# Patient Record
(demographics unavailable — no encounter records)

---

## 2025-03-24 NOTE — DISCUSSION/SUMMARY
[ Transition] :  transition [ Care] :  care [Nutritional Adequacy] : nutritional adequacy [Parental Well-Being] : parental well-being [Safety] : safety [Hepatitis B In Hospital] : Hepatitis B administered while in the hospital [FreeTextEntry1] : 5 day old male born full term/, uncomplicated nursery course, here for initial  visit. Well appearing  on exam. Passed CCHD, NBHS in nursery. NBS pending at this time. TcB of 10.   -Age-appropriate anticipatory guidance provided -RTC for next WCC visit/sooner if any concerns arise

## 2025-03-24 NOTE — PHYSICAL EXAM
[Alert] : alert [Acute Distress] : no acute distress [Normocephalic] : normocephalic [Flat Open Anterior [place]] : flat open anterior fontanelle [Icteric sclera] : icteric sclera [Red Reflex Bilateral] : red reflex bilateral [Normally Placed Ears] : normally placed ears [Auricles Well Formed] : auricles well formed [Bony structures visible] : bony structures visible [Discharge] : no discharge [Nares Patent] : nares patent [Palate Intact] : palate intact [Uvula Midline] : uvula midline [Supple, full passive range of motion] : supple, full passive range of motion [Palpable Masses] : no palpable masses [Symmetric Chest Rise] : symmetric chest rise [Clear to Auscultation Bilaterally] : clear to auscultation bilaterally [Regular Rate and Rhythm] : regular rate and rhythm [S1, S2 present] : S1, S2 present [Murmurs] : no murmurs [+2 Femoral Pulses] : +2 femoral pulses [Soft] : soft [Tender] : nontender [Distended] : not distended [Bowel Sounds] : bowel sounds present [Umbilical Stump Dry, Clean, Intact] : umbilical stump dry, clean, intact [Hepatomegaly] : no hepatomegaly [Splenomegaly] : no splenomegaly [Normal external genitailia] : normal external genitalia [Central Urethral Opening] : central urethral opening [Testicles Descended Bilaterally] : testicles descended bilaterally [Patent] : patent [Normally Placed] : normally placed [No Abnormal Lymph Nodes Palpated] : no abnormal lymph nodes palpated [Wilkerson-Ortolani] : negative Wilkerson-Ortolani [Symmetric Flexed Extremities] : symmetric flexed extremities [Spinal Dimple] : no spinal dimple [Tuft of Hair] : no tuft of hair [Startle Reflex] : startle reflex present [Suck Reflex] : suck reflex present [Rooting] : rooting reflex present [Palmar Grasp] : palmar grasp present [Plantar Grasp] : plantar reflex present [Symmetric Shira] : symmetric [place] [Jaundice] : jaundice

## 2025-03-24 NOTE — HISTORY OF PRESENT ILLNESS
[Born at ___ Wks Gestation] : The patient was born at [unfilled] weeks gestation [] : via normal spontaneous vaginal delivery [[place]] : at [facility] [(1) _____] : [unfilled] [(5) _____] : [unfilled] [None] : There were no delivery complications [BW: _____] : weight of [unfilled] [Length: _____] : length of [unfilled] [HC: _____] : head circumference of [unfilled] [DW: _____] : Discharge weight was [unfilled] [RSV vaccine] : RSV vaccine received by mother at least 14 days prior to delivery [HepBsAG] : HepBsAg negative [HIV] : HIV negative [GBS] : GBS negative [Rubella (Immune)] : Rubella immune [VDRL/RPR (Reactive)] : VDRL/RPR nonreactive [Yes] : Yes [] : Circumcision: No [FreeTextEntry9] : O+ [Normal] : Normal [In Bassinet/Crib] : sleeps in bassinet/crib [On back] : sleeps on back [Co-sleeping] : no co-sleeping [Pacifier] : Uses pacifier [No] : No cigarette smoke exposure [Exposure to electronic nicotine delivery system] : No exposure to electronic nicotine delivery system [Water heater temperature set at <120 degrees F] : Water heater temperature set at <120 degrees F [Rear facing car seat in back seat] : Rear facing car seat in back seat [Carbon Monoxide Detectors] : Carbon monoxide detectors at home [Smoke Detectors] : Smoke detectors at home. [Hepatitis B Vaccine Given] : Hepatitis B vaccine given [FreeTextEntry7] : Initial  visit  [de-identified] : No acute concerns  [de-identified] : BM every 2-3 hours

## 2025-04-11 NOTE — REVIEW OF SYSTEMS
Problem: Discharge Planning  Goal: Discharge to home or other facility with appropriate resources  Outcome: Progressing     Problem: Thermoregulation - /Pediatrics  Goal: Maintains normal body temperature  Outcome: Progressing      [Negative] : Genitourinary

## 2025-04-11 NOTE — PHYSICAL EXAM
[Alert] : alert [Acute Distress] : no acute distress [Normocephalic] : normocephalic [Flat Open Anterior [place]] : flat open anterior fontanelle [Icteric sclera] : icteric sclera [Red Reflex Bilateral] : red reflex bilateral [Normally Placed Ears] : normally placed ears [Auricles Well Formed] : auricles well formed [Bony structures visible] : bony structures visible [Discharge] : no discharge [Nares Patent] : nares patent [Palate Intact] : palate intact [Uvula Midline] : uvula midline [Supple, full passive range of motion] : supple, full passive range of motion [Palpable Masses] : no palpable masses [Symmetric Chest Rise] : symmetric chest rise [Clear to Auscultation Bilaterally] : clear to auscultation bilaterally [Regular Rate and Rhythm] : regular rate and rhythm [S1, S2 present] : S1, S2 present [Murmurs] : no murmurs [+2 Femoral Pulses] : +2 femoral pulses [Soft] : soft [Tender] : nontender [Distended] : not distended [Bowel Sounds] : bowel sounds present [Hepatomegaly] : no hepatomegaly [Splenomegaly] : no splenomegaly [Normal external genitailia] : normal external genitalia [Central Urethral Opening] : central urethral opening [Testicles Descended Bilaterally] : testicles descended bilaterally [Patent] : patent [Normally Placed] : normally placed [No Abnormal Lymph Nodes Palpated] : no abnormal lymph nodes palpated [Wilkerson-Ortolani] : negative Wilkerson-Ortolani [Symmetric Flexed Extremities] : symmetric flexed extremities [Spinal Dimple] : no spinal dimple [Tuft of Hair] : no tuft of hair [Startle Reflex] : startle reflex present [Suck Reflex] : suck reflex present [Rooting] : rooting reflex present [Palmar Grasp] : palmar grasp present [Plantar Grasp] : plantar reflex present [Symmetric Shira] : symmetric [place] [Jaundice] : jaundice [FreeTextEntry9] : (+) Umbilical hernia, small and reducible

## 2025-04-11 NOTE — DISCUSSION/SUMMARY
[ Transition] :  transition [ Care] :  care [Nutritional Adequacy] : nutritional adequacy [Parental Well-Being] : parental well-being [Safety] : safety [Hepatitis B In Hospital] : Hepatitis B administered while in the hospital [FreeTextEntry1] :  2 week WCC visit. Well appearing  on exam, no acute findings. Gaining weight adequately.   -Age-appropriate anticipatory guidance provided -Mild scleral icterus on exam: Suspect breastmilk jaundice given presentation. Mother reassured, continue breastfeeding.  -NBS negative: Results discussed with parent -RTC for next WCC visit/sooner if any concerns arise

## 2025-04-11 NOTE — HISTORY OF PRESENT ILLNESS
[Born at ___ Wks Gestation] : The patient was born at [unfilled] weeks gestation [] : via normal spontaneous vaginal delivery [[place]] : at [facility] [(1) _____] : [unfilled] [(5) _____] : [unfilled] [None] : There were no delivery complications [BW: _____] : weight of [unfilled] [Length: _____] : length of [unfilled] [HC: _____] : head circumference of [unfilled] [DW: _____] : Discharge weight was [unfilled] [RSV vaccine] : RSV vaccine received by mother at least 14 days prior to delivery [HepBsAG] : HepBsAg negative [HIV] : HIV negative [GBS] : GBS negative [Rubella (Immune)] : Rubella immune [VDRL/RPR (Reactive)] : VDRL/RPR nonreactive [Yes] : Yes [] : Circumcision: No [FreeTextEntry9] : O+ [Normal] : Normal [In Bassinet/Crib] : sleeps in bassinet/crib [On back] : sleeps on back [Co-sleeping] : no co-sleeping [Pacifier] : Uses pacifier [No] : No cigarette smoke exposure [Exposure to electronic nicotine delivery system] : No exposure to electronic nicotine delivery system [Water heater temperature set at <120 degrees F] : Water heater temperature set at <120 degrees F [Rear facing car seat in back seat] : Rear facing car seat in back seat [Carbon Monoxide Detectors] : Carbon monoxide detectors at home [Smoke Detectors] : Smoke detectors at home. [Hepatitis B Vaccine Given] : Hepatitis B vaccine given [FreeTextEntry7] : 2 week WCC visit  [de-identified] : Skin looks yellow  [de-identified] : Breast milk ad gt

## 2025-04-25 NOTE — DISCUSSION/SUMMARY
[Parental Well-Being] : parental well-being [Family Adjustment] : family adjustment [Feeding Routines] : feeding routines [Infant Adjustment] : infant adjustment [Safety] : safety [] : The components of the vaccine(s) to be administered today are listed in the plan of care. The disease(s) for which the vaccine(s) are intended to prevent and the risks have been discussed with the caretaker.  The risks are also included in the appropriate vaccination information statements which have been provided to the patient's caregiver.  The caregiver has given consent to vaccinate. [FreeTextEntry1] :  1 month old for WCC visit. Well appearing infant on exam, no acute medical findings.  -Age-appropriate anticipatory guidance provided -Due for Hep B #2, declined by mother at today's visit. Plans to RTC in 1-2 weeks for vaccine -RTC for next WCC visit/sooner if any concerns arise.

## 2025-04-25 NOTE — PHYSICAL EXAM
[Alert] : alert [Acute Distress] : no acute distress [Normocephalic] : normocephalic [Flat Open Anterior [place]] : flat open anterior fontanelle [PERRL] : PERRL [Red Reflex Bilateral] : red reflex bilateral [Normally Placed Ears] : normally placed ears [Auricles Well Formed] : auricles well formed [Bony landmarks visible] : bony landmarks visible [Discharge] : no discharge [Nares Patent] : nares patent [Palate Intact] : palate intact [Uvula Midline] : uvula midline [Supple, full passive range of motion] : supple, full passive range of motion [Palpable Masses] : no palpable masses [Symmetric Chest Rise] : symmetric chest rise [Clear to Auscultation Bilaterally] : clear to auscultation bilaterally [Regular Rate and Rhythm] : regular rate and rhythm [S1, S2 present] : S1, S2 present [Murmurs] : no murmurs [+2 Femoral Pulses] : +2 femoral pulses [Soft] : soft [Tender] : nontender [Distended] : not distended [Bowel Sounds] : bowel sounds present [Hepatomegaly] : no hepatomegaly [Splenomegaly] : no splenomegaly [Normal external genitailia] : normal external genitalia [Testicles Descended Bilaterally] : testicles descended bilaterally [Normally Placed] : normally placed [No Abnormal Lymph Nodes Palpated] : no abnormal lymph nodes palpated [Wilkerson-Ortolani] : negative Wilkerson-Ortolani [Symmetric Flexed Extremities] : symmetric flexed extremities [Spinal Dimple] : no spinal dimple [Tuft of Hair] : no tuft of hair [Startle Reflex] : startle reflex present [Suck Reflex] : suck reflex present [Rooting] : rooting reflex present [Palmar Grasp] : palmar grasp reflex present [Plantar Grasp] : plantar grasp reflex present [Symmetric Shira] : symmetric [place] [Jaundice] : no jaundice [Rash and/or lesion present] : no rash/lesion

## 2025-04-25 NOTE — HISTORY OF PRESENT ILLNESS
[Mother] : mother [Normal] : Normal [In Bassinet/Crib] : sleeps in bassinet/crib [On back] : sleeps on back [Co-sleeping] : no co-sleeping [No] : No cigarette smoke exposure [Water heater temperature set at <120 degrees F] : Water heater temperature set at <120 degrees F [Rear facing car seat in back seat] : Rear facing car seat in back seat [Carbon Monoxide Detectors] : Carbon monoxide detectors at home [Smoke Detectors] : Smoke detectors at home. [At risk for exposure to TB] : Not at risk for exposure to Tuberculosis  [FreeTextEntry7] : 1 month C visit  [de-identified] : BM + Formula 3-4oz every 3 hours

## 2025-06-03 NOTE — HISTORY OF PRESENT ILLNESS
[Mother] : mother [Breast milk] : breast milk [Normal] : Normal [Frequency of stools: ___] : Frequency of stools: [unfilled]  stools [In Bassinet/Crib] : sleeps in bassinet/crib [On back] : sleeps on back [Co-sleeping] : no co-sleeping [No] : No cigarette smoke exposure [Water heater temperature set at <120 degrees F] : Water heater temperature set at <120 degrees F [Rear facing car seat in back seat] : Rear facing car seat in back seat [Carbon Monoxide Detectors] : Carbon monoxide detectors at home [Smoke Detectors] : Smoke detectors at home. [At risk for exposure to TB] : Not at risk for exposure to Tuberculosis  [de-identified] : Ad gt

## 2025-06-03 NOTE — PHYSICAL EXAM
[Alert] : alert [Acute Distress] : no acute distress [Flat Open Anterior [place]] : flat open anterior fontanelle [PERRL] : PERRL [Red Reflex Bilateral] : red reflex bilateral [Normally Placed Ears] : normally placed ears [Auricles Well Formed] : auricles well formed [Bony landmarks visible] : bony landmarks visible [Discharge] : no discharge [Nares Patent] : nares patent [Palate Intact] : palate intact [Uvula Midline] : uvula midline [Supple, full passive range of motion] : supple, full passive range of motion [Palpable Masses] : no palpable masses [Symmetric Chest Rise] : symmetric chest rise [Clear to Auscultation Bilaterally] : clear to auscultation bilaterally [Regular Rate and Rhythm] : regular rate and rhythm [S1, S2 present] : S1, S2 present [Murmurs] : no murmurs [+2 Femoral Pulses] : +2 femoral pulses [Soft] : soft [Tender] : nontender [Distended] : not distended [Bowel Sounds] : bowel sounds present [Hepatomegaly] : no hepatomegaly [Splenomegaly] : no splenomegaly [Normal external genitailia] : normal external genitalia [Central Urethral Opening] : central urethral opening [Testicles Descended Bilaterally] : testicles descended bilaterally [Normally Placed] : normally placed [No Abnormal Lymph Nodes Palpated] : no abnormal lymph nodes palpated [Wilkerson-Ortolani] : negative Wilkerson-Ortolani [Symmetric Flexed Extremities] : symmetric flexed extremities [Spinal Dimple] : no spinal dimple [Tuft of Hair] : no tuft of hair [Startle Reflex] : startle reflex present [Suck Reflex] : suck reflex present [Rooting] : rooting reflex present [Palmar Grasp] : palmar grasp reflex present [Plantar Grasp] : plantar grasp reflex present [Symmetric Shira] : symmetric [place] [Rash and/or lesion present] : no rash/lesion [FreeTextEntry2] : (+) Plagiocephaly

## 2025-06-03 NOTE — DISCUSSION/SUMMARY
[Parental (Maternal) Well-Being] : parental (maternal) well-being [Infant-Family Synchrony] : infant-family synchrony [Nutritional Adequacy] : nutritional adequacy [Infant Behavior] : infant behavior [Safety] : safety [FreeTextEntry1] :  2 month old for WCC visit. Well appearing infant on exam in no acute distress. Age-appropriate anticipatory guidance provided. Growth and development appropriate for age.   -Vaccines deferred at today's visit by mother as child is having mild runny nose. Will RTC for Rotateq and PCV in 2 weeks, Pentacel in 4 weeks. She prefers to space the vaccines.  -Acquired Plagiocephaly: Prefers to keep head turned to right. No torticollis appreciated on exam. Recommend tummy time and repositioning of head. Will follow up and continue to monitor at subsequent visit -RTC for next WCC visit/sooner if any concerns arise

## 2025-06-28 NOTE — HISTORY OF PRESENT ILLNESS
[FreeTextEntry6] : Patient was well until a few days ago with redness at right outer corner of eye. denies any eye discharge/redness Pt has been doing well, no fever, feeding well. Mother denies any maternal infection, pt did receive erythromycin eye ointment at birth Patient is active, playful, normal appetite, urinating and stooling well no F/V/D/abd pain/rash, no sore throat, no ear pain, no difficulty breathing no ill contact no recent travel

## 2025-06-28 NOTE — DISCUSSION/SUMMARY
[FreeTextEntry1] : 3 months M with possible stye to right upper lid, no signs of infection. Advised warm compresses to eye, hand washing, avoid rubbing/touching eyes RTC or to ER if s/s worsen - fever, eye pain/discharge/vision changes.

## 2025-06-28 NOTE — PHYSICAL EXAM
[[place]] : fontanelle flat [EOMI] : grossly EOMI [Increased Tearing] : no increased tearing [Discharge] : no discharge [Conjunctival Injection] : no conjunctival injection [NL] : warm, clear [FreeTextEntry2] : AFOF [FreeTextEntry5] : + mildly erythematous papule at right outer corner of upper eyelid